# Patient Record
Sex: MALE | URBAN - METROPOLITAN AREA
[De-identification: names, ages, dates, MRNs, and addresses within clinical notes are randomized per-mention and may not be internally consistent; named-entity substitution may affect disease eponyms.]

---

## 2022-11-07 ENCOUNTER — OFFICE VISIT (OUTPATIENT)
Facility: LOCATION | Age: 87
End: 2022-11-07
Payer: COMMERCIAL

## 2022-11-07 DIAGNOSIS — E11.9 TYPE 2 DIABETES MELLITUS W/O COMPLICATION: Primary | ICD-10-CM

## 2022-11-07 DIAGNOSIS — H26.493 OTHER SECONDARY CATARACT, BILATERAL: ICD-10-CM

## 2022-11-07 DIAGNOSIS — H35.3131 NONEXUDATIVE MACULAR DEGENERATION, EARLY DRY STAGE, BILATERAL: ICD-10-CM

## 2022-11-07 DIAGNOSIS — H00.15 CHALAZION OF LEFT LOWER EYELID: ICD-10-CM

## 2022-11-07 PROCEDURE — 92004 COMPRE OPH EXAM NEW PT 1/>: CPT

## 2022-11-07 NOTE — IMPRESSION/PLAN
Impression: Other secondary cataract, bilateral: H26.493. Plan: Pt educated on condition. Not significantly affecting vision at this time. Monitor.

## 2022-11-07 NOTE — IMPRESSION/PLAN
Impression: Nonexudative macular degeneration, early dry stage, bilateral: H35.8173. Plan: Pt educated on findings today. Recommended a diet high in green leafy vegetables, UV protection, and tobacco avoidance. Recommend starting AREDS2 po BID. Pt to notify clinic immediately if change in vision is noted. Pt expressed understanding. RTC 6 months for mac OCT and DFE.

## 2022-11-07 NOTE — IMPRESSION/PLAN
Impression: Chalazion of left lower eyelid: H00.15. Plan: Pt educated on condition. Recommended pt start warm compresses with lid scrubs BID. Pt informed that baby shampoo can substitute for OTC lid scrubs. Pt to notify clinic if symptoms worsen or do not improve. Pt expressed understanding. Check at 6mo visit. Advised pt to notify clinic if bump grows or if the lid margin starts to change.

## 2022-11-07 NOTE — IMPRESSION/PLAN
Impression: Type 2 diabetes mellitus w/o complication: G14.2. Plan: Pt educated on stable findings. Stressed importance of BS/BP control and continued care with PCP/endocrinologist. Will write letter to PCP/endocrinologist regarding exam findings.  RTC 1-year for annual diabetic eye exam.